# Patient Record
Sex: FEMALE | Race: WHITE | NOT HISPANIC OR LATINO | Employment: FULL TIME | URBAN - METROPOLITAN AREA
[De-identification: names, ages, dates, MRNs, and addresses within clinical notes are randomized per-mention and may not be internally consistent; named-entity substitution may affect disease eponyms.]

---

## 2019-03-29 ENCOUNTER — TELEPHONE (OUTPATIENT)
Dept: FAMILY MEDICINE CLINIC | Facility: CLINIC | Age: 29
End: 2019-03-29

## 2019-10-29 ENCOUNTER — TELEPHONE (OUTPATIENT)
Dept: OBGYN CLINIC | Facility: CLINIC | Age: 29
End: 2019-10-29

## 2019-10-29 NOTE — TELEPHONE ENCOUNTER
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

## 2019-11-22 ENCOUNTER — OFFICE VISIT (OUTPATIENT)
Dept: FAMILY MEDICINE CLINIC | Facility: CLINIC | Age: 29
End: 2019-11-22
Payer: COMMERCIAL

## 2019-11-22 ENCOUNTER — APPOINTMENT (OUTPATIENT)
Dept: RADIOLOGY | Facility: CLINIC | Age: 29
End: 2019-11-22
Payer: COMMERCIAL

## 2019-11-22 ENCOUNTER — OFFICE VISIT (OUTPATIENT)
Dept: OBGYN CLINIC | Facility: CLINIC | Age: 29
End: 2019-11-22
Payer: COMMERCIAL

## 2019-11-22 VITALS
HEIGHT: 63 IN | HEART RATE: 65 BPM | SYSTOLIC BLOOD PRESSURE: 110 MMHG | BODY MASS INDEX: 24.52 KG/M2 | DIASTOLIC BLOOD PRESSURE: 68 MMHG | WEIGHT: 138.4 LBS

## 2019-11-22 VITALS
RESPIRATION RATE: 12 BRPM | BODY MASS INDEX: 24.45 KG/M2 | OXYGEN SATURATION: 99 % | TEMPERATURE: 97.5 F | DIASTOLIC BLOOD PRESSURE: 70 MMHG | WEIGHT: 138 LBS | HEIGHT: 63 IN | HEART RATE: 100 BPM | SYSTOLIC BLOOD PRESSURE: 114 MMHG

## 2019-11-22 DIAGNOSIS — S62.635S: Primary | ICD-10-CM

## 2019-11-22 DIAGNOSIS — Z83.438 FAMILY HISTORY OF HYPERLIPIDEMIA: ICD-10-CM

## 2019-11-22 DIAGNOSIS — R53.83 FATIGUE, UNSPECIFIED TYPE: ICD-10-CM

## 2019-11-22 DIAGNOSIS — Z76.89 ENCOUNTER TO ESTABLISH CARE: ICD-10-CM

## 2019-11-22 DIAGNOSIS — S62.635B DISPLACED FRACTURE OF DISTAL PHALANX OF LEFT RING FINGER, INITIAL ENCOUNTER FOR OPEN FRACTURE: Primary | ICD-10-CM

## 2019-11-22 DIAGNOSIS — S62.635B DISPLACED FRACTURE OF DISTAL PHALANX OF LEFT RING FINGER, INITIAL ENCOUNTER FOR OPEN FRACTURE: ICD-10-CM

## 2019-11-22 PROCEDURE — 99243 OFF/OP CNSLTJ NEW/EST LOW 30: CPT | Performed by: ORTHOPAEDIC SURGERY

## 2019-11-22 PROCEDURE — 1036F TOBACCO NON-USER: CPT | Performed by: NURSE PRACTITIONER

## 2019-11-22 PROCEDURE — 36415 COLL VENOUS BLD VENIPUNCTURE: CPT | Performed by: NURSE PRACTITIONER

## 2019-11-22 PROCEDURE — 73130 X-RAY EXAM OF HAND: CPT

## 2019-11-22 PROCEDURE — 99204 OFFICE O/P NEW MOD 45 MIN: CPT | Performed by: NURSE PRACTITIONER

## 2019-11-22 RX ORDER — MULTIVITAMIN
1 TABLET ORAL DAILY
COMMUNITY

## 2019-11-22 NOTE — LETTER
November 25, 2019     Walter Frederick [address] [address]    Patient: Angelic Angeles   YOB: 1990   Date of Visit: 11/22/2019       Dear Dr Marino Cotto: Thank you for referring Meron Niño to me for evaluation  Below are my notes for this consultation  If you have questions, please do not hesitate to call me  I look forward to following your patient along with you  Sincerely,        Harinder Figueredo DO        CC: No Recipients  Harinder Figueredo DO  11/22/2019  5:58 PM  Signed  Assessment/Plan:  1  Displaced fracture of distal phalanx of left ring finger, initial encounter for open fracture  XR hand 3+ vw left    Ambulatory referral to Occupational Therapy     Patient is doing well  X-rays demonstrate anatomic alignment of the DIP  It was explained to her that she has a fracture of the base of P3 and significant tendon injury  She has had the pin in for 6 weeks  She will likely have some stiffness of this joint  We will start physical therapy this time  The pin was removed  She does have some granulation tissue and some wounds over the dorsal aspect of the index finger we will do Betadine soaks for this  She can also get wound care at therapy  I did receive records from the operating surgeon later surgery done in [place]  I reviewed them  Subjective: Follow up visit after LSF repair at OSH    Patient ID: Angelic Angeles is a 26 y o  female  HPI  Patient is a 26year old female who presents 6 weeks post op from DIP joint pitting and ext tendon repair zone 1 of the LSF for an open p3 fracture and ext injury from a fan blade done at an OSH  She lives in [place] and was in [place] when then injury occurred six weeks ago  She underwent uneventful surgery and presents today for follow up  She denies any fevers or chills  Old records were reviewed  Review of Systems   Constitutional: Positive for activity change   Negative for chills, fever and unexpected weight change  HENT: Negative for hearing loss, nosebleeds and sore throat  Eyes: Negative for pain, redness and visual disturbance  Respiratory: Negative for cough, shortness of breath and wheezing  Cardiovascular: Negative for chest pain, palpitations and leg swelling  Gastrointestinal: Negative for abdominal pain, nausea and vomiting  Endocrine: Negative for polydipsia and polyuria  Genitourinary: Negative for dysuria and hematuria  Musculoskeletal:        See HPI   Skin: Negative for rash and wound  Neurological: Negative for dizziness, numbness and headaches  Psychiatric/Behavioral: Negative for decreased concentration and suicidal ideas  The patient is not nervous/anxious            Past Medical History:   Diagnosis Date    Fractures     Status post tendon repair     tendon repair of left ring finger       Past Surgical History:   Procedure Laterality Date    HAND SURGERY      WISDOM TOOTH EXTRACTION         Family History   Problem Relation Age of Onset    No Known Problems Mother     No Known Problems Father     No Known Problems Sister     No Known Problems Brother     No Known Problems Maternal Aunt     No Known Problems Maternal Uncle     No Known Problems Paternal Aunt     No Known Problems Paternal Uncle     No Known Problems Maternal Grandmother     No Known Problems Maternal Grandfather     No Known Problems Paternal Grandmother     No Known Problems Paternal Grandfather     Substance Abuse Neg Hx     Mental illness Neg Hx        Social History     Occupational History    Not on file   Tobacco Use    Smoking status: Never Smoker    Smokeless tobacco: Never Used   Substance and Sexual Activity    Alcohol use: Never     Frequency: Never    Drug use: Never    Sexual activity: Yes     Partners: Male     Birth control/protection: Condom Male         Current Outpatient Medications:     Multiple Vitamin (MULTIVITAMIN) tablet, Take 1 tablet by mouth daily, Disp: , Rfl:     Probiotic Product (PROBIOTIC DAILY PO), Take by mouth, Disp: , Rfl:     No Known Allergies    Objective:  Vitals:    11/22/19 0914   BP: 110/68   Pulse: 65       Body mass index is 24 52 kg/m²  Ortho Exam     L SF  Granulation tissue present over the dorsal aspect of the DIP  No purulence  No redness  Pin rmoved without issue  Nail plate absent  Nail bed repaired and healing well  Limited motion as expected of DIP  No volar wounds    Physical Exam   Constitutional: She is oriented to person, place, and time  She appears well-developed and well-nourished  HENT:   Head: Normocephalic and atraumatic  Eyes: Pupils are equal, round, and reactive to light  Conjunctivae are normal    Neck: Normal range of motion  Neck supple  Cardiovascular: Normal rate and intact distal pulses  Pulmonary/Chest: Effort normal  No respiratory distress  Musculoskeletal:   As noted in HPI   Neurological: She is alert and oriented to person, place, and time  Skin: Skin is warm and dry  Psychiatric: She has a normal mood and affect  Her behavior is normal    Nursing note and vitals reviewed  I have personally reviewed pertinent films in PACS    Xr demonstrate healing fracture of the base of p3 with excellent alignment of the DIP

## 2019-11-22 NOTE — PROGRESS NOTES
Assessment/Plan:  1  Open displaced fracture of distal phalanx of left ring finger, sequela  Management per ortho  Physical therapy as ordered  - CBC and differential  - Vitamin D 25 hydroxy  - Comprehensive metabolic panel  2  Encounter to establish care  Health maintenance discussed  Diet, exercise, weight management, stress management, etc    All questions addressed, answered, and pt verbalized understanding  Anticipatory guidance  - CBC and differential  - Lipid panel  - TSH, 3rd generation  - Vitamin D 25 hydroxy  - Comprehensive metabolic panel  3  Fatigue, unspecified type  - CBC and differential  - TSH, 3rd generation  - Vitamin D 25 hydroxy  - Comprehensive metabolic panel  4  Family history of hyperlipidemia  - Lipid panel      BMI Counseling: Body mass index is 24 45 kg/m²  The BMI is wnl  Counseled on balanced diet and regular exercise  Subjective:      Patient ID: Mark Huang is a 26 y o  female who presents to establish care    Here to establish care  Had injury to left ring finger while on vacation in [place] requiring surgery  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 Follow up locally with Dr Eduard Rosales who saw her first time today  Injury occurred 10/3/19  Had pin placed  Removed today by Dr Eduard Rosales  Will be starting physical therapy  PMH: none  PSH: surgery to left ring finger 10/3/19 for fracture that involved tendons  Meds/allergies reviewed  FH: mother- htn, father- healthy, paternal grandmother- dm  SH: non smoker, no etoh  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[employer] ([employer])   Lives alone  No recreational drugs  Feels well  Occ fatigue  No recent illness           The following portions of the patient's history were reviewed and updated as appropriate: allergies, current medications, past family history, past medical history, past social history, past surgical history and problem list     Review of Systems   Constitutional: Negative for activity change, appetite change and unexpected weight change  HENT: Negative for congestion and sore throat  Eyes: Negative for visual disturbance  Respiratory: Negative for cough, chest tightness and shortness of breath  Cardiovascular: Negative for chest pain and leg swelling  Gastrointestinal: Negative for abdominal pain, diarrhea, nausea and vomiting  Endocrine: Negative for cold intolerance, heat intolerance, polydipsia, polyphagia and polyuria  Genitourinary: Negative for frequency and urgency  Musculoskeletal: Negative for back pain and neck pain  Left ring finger recent fracture  Pin removed  Will decreased motion but improving  Some discoloration  Healing well per ortho   Skin: Positive for wound (healing surgical wound left ring finger)  Allergic/Immunologic: Negative for immunocompromised state  Neurological: Negative for dizziness and headaches  Hematological: Negative for adenopathy  Psychiatric/Behavioral: Negative for self-injury and suicidal ideas  The patient is not nervous/anxious  All other systems reviewed and are negative  Objective:      /70 (BP Location: Right arm, Patient Position: Sitting, Cuff Size: Standard)   Pulse 100   Temp 97 5 °F (36 4 °C) (Temporal)   Resp 12   Ht 5' 3" (1 6 m)   Wt 62 6 kg (138 lb)   LMP 11/20/2019   SpO2 99%   BMI 24 45 kg/m²          Physical Exam   Constitutional: She is oriented to person, place, and time  She appears well-developed and well-nourished  No distress  Cardiovascular: Normal rate and regular rhythm  No JVD  No audible carotid bruit  No peripheral edema  Pulmonary/Chest: Effort normal and breath sounds normal  No respiratory distress  She has no wheezes  Abdominal: Soft  Bowel sounds are normal  She exhibits no distension  There is no tenderness  Musculoskeletal:   Decreased rom left ring finger  Mild edema left ring finger      Neurological: She is alert and oriented to person, place, and time  No cranial nerve deficit  Coordination normal    Skin: Skin is warm and dry  No rash noted  She is not diaphoretic  Mild ecchymosis left ring finger  Psychiatric: She has a normal mood and affect  Her behavior is normal  Judgment and thought content normal    Vitals reviewed

## 2019-11-22 NOTE — PROGRESS NOTES
Assessment/Plan:  1  Displaced fracture of distal phalanx of left ring finger, initial encounter for open fracture  XR hand 3+ vw left    Ambulatory referral to Occupational Therapy     Patient is doing well  X-rays demonstrate anatomic alignment of the DIP  It was explained to her that she has a fracture of the base of P3 and significant tendon injury  She has had the pin in for 6 weeks  She will likely have some stiffness of this joint  We will start physical therapy this time  The pin was removed  She does have some granulation tissue and some wounds over the dorsal aspect of the index finger we will do Betadine soaks for this  She can also get wound care at therapy  I did receive records from the operating surgeon later surgery done in [place]  I reviewed them  Subjective: Follow up visit after LSF repair at OSH    Patient ID: Juana Arboleda is a 26 y o  female  HPI  Patient is a 26year old female who presents 6 weeks post op from DIP joint pitting and ext tendon repair zone 1 of the LSF for an open p3 fracture and ext injury from a fan blade done at an OSH  She lives in [place] and was in [place] when then injury occurred six weeks ago  She underwent uneventful surgery and presents today for follow up  She denies any fevers or chills  Old records were reviewed  Review of Systems   Constitutional: Positive for activity change  Negative for chills, fever and unexpected weight change  HENT: Negative for hearing loss, nosebleeds and sore throat  Eyes: Negative for pain, redness and visual disturbance  Respiratory: Negative for cough, shortness of breath and wheezing  Cardiovascular: Negative for chest pain, palpitations and leg swelling  Gastrointestinal: Negative for abdominal pain, nausea and vomiting  Endocrine: Negative for polydipsia and polyuria  Genitourinary: Negative for dysuria and hematuria  Musculoskeletal:        See HPI   Skin: Negative for rash and wound  Neurological: Negative for dizziness, numbness and headaches  Psychiatric/Behavioral: Negative for decreased concentration and suicidal ideas  The patient is not nervous/anxious  Past Medical History:   Diagnosis Date    Fractures     Status post tendon repair     tendon repair of left ring finger       Past Surgical History:   Procedure Laterality Date    HAND SURGERY      WISDOM TOOTH EXTRACTION         Family History   Problem Relation Age of Onset    No Known Problems Mother     No Known Problems Father     No Known Problems Sister     No Known Problems Brother     No Known Problems Maternal Aunt     No Known Problems Maternal Uncle     No Known Problems Paternal Aunt     No Known Problems Paternal Uncle     No Known Problems Maternal Grandmother     No Known Problems Maternal Grandfather     No Known Problems Paternal Grandmother     No Known Problems Paternal Grandfather     Substance Abuse Neg Hx     Mental illness Neg Hx        Social History     Occupational History    Not on file   Tobacco Use    Smoking status: Never Smoker    Smokeless tobacco: Never Used   Substance and Sexual Activity    Alcohol use: Never     Frequency: Never    Drug use: Never    Sexual activity: Yes     Partners: Male     Birth control/protection: Condom Male         Current Outpatient Medications:     Multiple Vitamin (MULTIVITAMIN) tablet, Take 1 tablet by mouth daily, Disp: , Rfl:     Probiotic Product (PROBIOTIC DAILY PO), Take by mouth, Disp: , Rfl:     No Known Allergies    Objective:  Vitals:    11/22/19 0914   BP: 110/68   Pulse: 65       Body mass index is 24 52 kg/m²  Ortho Exam     L SF  Granulation tissue present over the dorsal aspect of the DIP  No purulence  No redness  Pin rmoved without issue  Nail plate absent  Nail bed repaired and healing well  Limited motion as expected of DIP  No volar wounds    Physical Exam   Constitutional: She is oriented to person, place, and time  She appears well-developed and well-nourished  HENT:   Head: Normocephalic and atraumatic  Eyes: Pupils are equal, round, and reactive to light  Conjunctivae are normal    Neck: Normal range of motion  Neck supple  Cardiovascular: Normal rate and intact distal pulses  Pulmonary/Chest: Effort normal  No respiratory distress  Musculoskeletal:   As noted in HPI   Neurological: She is alert and oriented to person, place, and time  Skin: Skin is warm and dry  Psychiatric: She has a normal mood and affect  Her behavior is normal    Nursing note and vitals reviewed  I have personally reviewed pertinent films in PACS    Xr demonstrate healing fracture of the base of p3 with excellent alignment of the DIP

## 2019-11-23 LAB
25(OH)D3+25(OH)D2 SERPL-MCNC: 17 NG/ML (ref 30–100)
ALBUMIN SERPL-MCNC: 4.8 G/DL (ref 3.5–5.5)
ALBUMIN/GLOB SERPL: 2.1 {RATIO} (ref 1.2–2.2)
ALP SERPL-CCNC: 65 IU/L (ref 39–117)
ALT SERPL-CCNC: 9 IU/L (ref 0–32)
AST SERPL-CCNC: 15 IU/L (ref 0–40)
BASOPHILS # BLD AUTO: 0 X10E3/UL (ref 0–0.2)
BASOPHILS NFR BLD AUTO: 0 %
BILIRUB SERPL-MCNC: 0.5 MG/DL (ref 0–1.2)
BUN SERPL-MCNC: 6 MG/DL (ref 6–20)
BUN/CREAT SERPL: 10 (ref 9–23)
CALCIUM SERPL-MCNC: 9.2 MG/DL (ref 8.7–10.2)
CHLORIDE SERPL-SCNC: 103 MMOL/L (ref 96–106)
CHOLEST SERPL-MCNC: 155 MG/DL (ref 100–199)
CHOLEST/HDLC SERPL: 2.9 RATIO (ref 0–4.4)
CO2 SERPL-SCNC: 21 MMOL/L (ref 20–29)
CREAT SERPL-MCNC: 0.63 MG/DL (ref 0.57–1)
EOSINOPHIL # BLD AUTO: 0 X10E3/UL (ref 0–0.4)
EOSINOPHIL NFR BLD AUTO: 1 %
ERYTHROCYTE [DISTWIDTH] IN BLOOD BY AUTOMATED COUNT: 13.6 % (ref 12.3–15.4)
GLOBULIN SER-MCNC: 2.3 G/DL (ref 1.5–4.5)
GLUCOSE SERPL-MCNC: 86 MG/DL (ref 65–99)
HCT VFR BLD AUTO: 41.3 % (ref 34–46.6)
HDLC SERPL-MCNC: 54 MG/DL
HGB BLD-MCNC: 13.9 G/DL (ref 11.1–15.9)
IMM GRANULOCYTES # BLD: 0 X10E3/UL (ref 0–0.1)
IMM GRANULOCYTES NFR BLD: 0 %
LDLC SERPL CALC-MCNC: 90 MG/DL (ref 0–99)
LYMPHOCYTES # BLD AUTO: 1 X10E3/UL (ref 0.7–3.1)
LYMPHOCYTES NFR BLD AUTO: 22 %
MCH RBC QN AUTO: 28.5 PG (ref 26.6–33)
MCHC RBC AUTO-ENTMCNC: 33.7 G/DL (ref 31.5–35.7)
MCV RBC AUTO: 85 FL (ref 79–97)
MONOCYTES # BLD AUTO: 0.4 X10E3/UL (ref 0.1–0.9)
MONOCYTES NFR BLD AUTO: 8 %
NEUTROPHILS # BLD AUTO: 3.3 X10E3/UL (ref 1.4–7)
NEUTROPHILS NFR BLD AUTO: 69 %
PLATELET # BLD AUTO: 248 X10E3/UL (ref 150–450)
POTASSIUM SERPL-SCNC: 3.9 MMOL/L (ref 3.5–5.2)
PROT SERPL-MCNC: 7.1 G/DL (ref 6–8.5)
RBC # BLD AUTO: 4.87 X10E6/UL (ref 3.77–5.28)
SL AMB EGFR AFRICAN AMERICAN: 139 ML/MIN/1.73
SL AMB EGFR NON AFRICAN AMERICAN: 121 ML/MIN/1.73
SL AMB VLDL CHOLESTEROL CALC: 11 MG/DL (ref 5–40)
SODIUM SERPL-SCNC: 140 MMOL/L (ref 134–144)
TRIGL SERPL-MCNC: 53 MG/DL (ref 0–149)
TSH SERPL DL<=0.005 MIU/L-ACNC: 2.07 UIU/ML (ref 0.45–4.5)
WBC # BLD AUTO: 4.8 X10E3/UL (ref 3.4–10.8)

## 2019-12-06 ENCOUNTER — OFFICE VISIT (OUTPATIENT)
Dept: OBGYN CLINIC | Facility: CLINIC | Age: 29
End: 2019-12-06
Payer: COMMERCIAL

## 2019-12-06 VITALS
SYSTOLIC BLOOD PRESSURE: 103 MMHG | DIASTOLIC BLOOD PRESSURE: 69 MMHG | BODY MASS INDEX: 24.8 KG/M2 | HEART RATE: 69 BPM | WEIGHT: 140 LBS | HEIGHT: 63 IN

## 2019-12-06 DIAGNOSIS — S62.635D DISPLACED FRACTURE OF DISTAL PHALANX OF LEFT RING FINGER, SUBSEQUENT ENCOUNTER FOR FRACTURE WITH ROUTINE HEALING: Primary | ICD-10-CM

## 2019-12-06 PROCEDURE — 99213 OFFICE O/P EST LOW 20 MIN: CPT | Performed by: ORTHOPAEDIC SURGERY

## 2019-12-13 ENCOUNTER — EVALUATION (OUTPATIENT)
Dept: PHYSICAL THERAPY | Facility: CLINIC | Age: 29
End: 2019-12-13
Payer: COMMERCIAL

## 2019-12-13 DIAGNOSIS — S62.639A: Primary | ICD-10-CM

## 2019-12-13 DIAGNOSIS — M20.012 MALLET FINGER OF LEFT FINGER(S): ICD-10-CM

## 2019-12-13 PROCEDURE — 97162 PT EVAL MOD COMPLEX 30 MIN: CPT

## 2019-12-13 NOTE — PROGRESS NOTES
PT Evaluation     Today's date: 2019  Patient name: Monika Vital  : 1990  MRN: [id number]  Referring provider: Shruthi Ross DO  Dx:   Encounter Diagnosis     ICD-10-CM    1  Displaced fracture of distal phalanx of finger of left hand S62 639A    2  Mallet finger of left finger(s) M20 012 Ambulatory referral to Occupational Therapy       Start Time: 805  Stop Time: 198  Total time in clinic (min): 65 minutes    Assessment  Assessment details: Patient presents today after extensor tendon repair on 10/9/19 over DIP with P3 fracture  Pain is intermittent reaching 5/10 at worst over healing nail bed  Wound and pin site is clean and healing well with no open wounds present  AROM limitations in extension of DIP of L ring finger indicated by ROM measurements  Patient was provided with custom extension splint for DIP of L ring finger which she was thoroughly educated to wear full time as per MD and demonstrated how to don/doff  Custom splint was inspected by MD prior to patient leaving, and MD stated that splint was good  Due to patient non-compliance with wearing splint as she arrived today without wearing splint originally provided by MD she was thoroughly educated on importance of being compliant with wear time  She was provided with comprehensive HEP to complete while wearing splint; patient verbalized understanding  Patient would benefit from skilled PT in order to reduce pain, increase ROM, and improve strength of L UE so 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, return to [employer],  complete ADLs and household chores, complete yard work, and sleep symptom free  Impairments: abnormal muscle tone, abnormal or restricted ROM, lacks appropriate home exercise program and pain with function  Understanding of Dx/Px/POC: good   Prognosis: good    Goals  STG's (4-6 weeks)  1  Reduce pain in L ring finger to 0/10 80% of the time so she can complete household chores where she lives alone     2  Improve pain free AROM/PROM of L ring finger so she can return to work as  and make pottery  3  Improve  strength by 10 lbs to improve her ability to open jars  4  Demonstrate how to properly don/doff splint     LTGs (6-8 weeks )  1  Patient will improve  strength to WNL for woman in her age group  to safely ensure she can carry and  heavy objects without risk of re injury where she lives alone  2  Patient will be able to extend DIP of L ring finger to 0 degrees after wearing splint full time  3  Patient will be able to weight bear through L UE pain free such as while cleaning or pushing somehting heavy  Plan  Patient would benefit from: PT eval and skilled physical therapy  Planned modality interventions: TENS, thermotherapy: hydrocollator packs and cryotherapy  Planned therapy interventions: joint mobilization, manual therapy, patient education, neuromuscular re-education, abdominal trunk stabilization, orthotic management and training, orthotic fitting/training, strengthening, stretching, therapeutic activities, therapeutic exercise, home exercise program, graded motor, graded exercise, graded activity, functional ROM exercises and flexibility  Frequency: 2x week  Duration in weeks: 8  Plan of Care beginning date: 12/13/2019  Plan of Care expiration date: 2/7/2020  Treatment plan discussed with: patient        Subjective Evaluation    History of Present Illness  Mechanism of injury: Patient reports that on 10/3/19 she was on vacation in [place]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 in [address]  Had extensor tendon repair on 10/9/19  Arrived today without splint provided by MD  Pain is at worst 5/10 when hit nail bed, also has pain from pinky that goes up to forearm  NT present on pinky  Patient asked if she can continue with making pottery and yoga , but she was thoroughly educated to hold from these activities still as she is still in splint full time  Pain  Current pain ratin  At best pain ratin  At worst pain ratin  Location: DIP joint of R ring finger   Quality: dull ache  Relieving factors: relaxation and rest    Social Support  Steps to enter house: yes  Stairs in house: yes   Lives in: multiple-level home  Lives with: alone    Employment status: working (retail )  Hand dominance: left    Treatments  Previous treatment: physical therapy  Current treatment: physical therapy  Patient Goals  Patient goals for therapy: increased strength, independence with ADLs/IADLs, increased motion, decreased pain and decreased edema          Objective     Active Range of Motion     Left Thumb   Extension     DIP: -15 degrees  Opposition: Table top extension: -(10)         Swelling     Right Wrist/Hand   Ring     Middle: 4 7 cm    Distal: 0 5 cm             Precautions:  Extensor tendon repair of DIP of Right finger- full time splint for 4 weeks  DOS=10/9/19    TE's:  Finger Blocking with splint on: 1 x 10 each joint (not DIP of L ring finger)  Table top Tendon Glides with splint on: 10 x   Straight Fist Tendon Glide with splint on: 10 x   Wrist AROM: all planes  Objective Measurements: 15'     Manual:  Wound Care: Bacatracin applied to nail bed with sterile applicator and sterile gloves worn by PT  Sterile bandages applied to wound site while PT wearing sterile gloves  Orthotic Fit & train:  Volar mallet finger splint-  For ring finger DIP   Splint pan maintained at 160 deg  Excess water was dried off prior to application onto patient  Barrier used between skin and splinting material  Patient thoroughly educated on use of splint full time and contact treating PT if notices any pressure points, sharp points, rubbing, bruising, etc  She verbalized understanding         Updated HEP: 19- table top and straight fist tendon glides, wrist AROM, finger blocking all joints except ring finger DIP- all completed while wearing custom splint provided by treating PT

## 2019-12-17 ENCOUNTER — OFFICE VISIT (OUTPATIENT)
Dept: PHYSICAL THERAPY | Facility: CLINIC | Age: 29
End: 2019-12-17
Payer: COMMERCIAL

## 2019-12-17 DIAGNOSIS — M20.012 MALLET FINGER OF LEFT FINGER(S): ICD-10-CM

## 2019-12-17 DIAGNOSIS — S62.639A: Primary | ICD-10-CM

## 2019-12-17 PROCEDURE — 97763 ORTHC/PROSTC MGMT SBSQ ENC: CPT

## 2019-12-17 PROCEDURE — 97110 THERAPEUTIC EXERCISES: CPT

## 2019-12-17 NOTE — PROGRESS NOTES
Daily Note     Today's date: 2019  Patient name: Bonilla Ren  : 1990  MRN: [id number]  Referring provider: Rita Almendarez DO  Dx:   Encounter Diagnosis     ICD-10-CM    1  Displaced fracture of distal phalanx of finger of left hand S62 639A    2  Mallet finger of left finger(s) M20 012        Start Time: 0755  Stop Time: 0845  Total time in clinic (min): 50 minutes    Subjective: patient reports soreness at home when completing HEP  Continues to wear splint full time  Objective: See treatment diary below      Assessment: Tolerated treatment well  Patient would benefit from continued PT      Plan: Continue per plan of care  Precautions:  Extensor tendon repair of DIP of Right finger- full time splint for 4 weeks  DOS=10/9/19    TE's: all completed with DIP splint on to keep DIP in extension as per MD  Finger Blocking with splint on: 1 x 10 each joint (not DIP of L ring finger)  Table top Tendon Glides with splint on: 10 x   Straight Fist Tendon Glide with splint on: 10 x   Peg board: each digit to thumb in and out  [employer] Board: 1 board in and out  [facility]  supination/pronation: 30x    Manual:  Wound Care: Bacatracin applied to nail bed with sterile applicator and sterile gloves worn by PT  Sterile bandages applied to wound site while PT wearing sterile gloves  Custom Splint Adjustment: For ring finger DIP   Splint pan maintained at 160 deg  Excess water was dried off prior to application onto patient  Barrier used between skin and splinting material  Adjusted splint to increase DIP extension and added foam for increase DIP extension  Patient thoroughly educated on use of splint full time and contact treating PT if notices any pressure points, sharp points, rubbing, bruising, etc  She verbalized understanding

## 2019-12-19 ENCOUNTER — APPOINTMENT (OUTPATIENT)
Dept: PHYSICAL THERAPY | Facility: CLINIC | Age: 29
End: 2019-12-19
Payer: COMMERCIAL

## 2019-12-20 ENCOUNTER — OFFICE VISIT (OUTPATIENT)
Dept: PHYSICAL THERAPY | Facility: CLINIC | Age: 29
End: 2019-12-20
Payer: COMMERCIAL

## 2019-12-20 ENCOUNTER — OFFICE VISIT (OUTPATIENT)
Dept: OBGYN CLINIC | Facility: CLINIC | Age: 29
End: 2019-12-20
Payer: COMMERCIAL

## 2019-12-20 ENCOUNTER — APPOINTMENT (OUTPATIENT)
Dept: RADIOLOGY | Facility: CLINIC | Age: 29
End: 2019-12-20
Payer: COMMERCIAL

## 2019-12-20 VITALS — BODY MASS INDEX: 24.8 KG/M2 | WEIGHT: 140 LBS | HEIGHT: 63 IN

## 2019-12-20 DIAGNOSIS — S62.635D DISPLACED FRACTURE OF DISTAL PHALANX OF LEFT RING FINGER, SUBSEQUENT ENCOUNTER FOR FRACTURE WITH ROUTINE HEALING: Primary | ICD-10-CM

## 2019-12-20 DIAGNOSIS — S62.639A: Primary | ICD-10-CM

## 2019-12-20 DIAGNOSIS — M20.012 MALLET FINGER OF LEFT FINGER(S): ICD-10-CM

## 2019-12-20 DIAGNOSIS — S62.635D DISPLACED FRACTURE OF DISTAL PHALANX OF LEFT RING FINGER, SUBSEQUENT ENCOUNTER FOR FRACTURE WITH ROUTINE HEALING: ICD-10-CM

## 2019-12-20 PROCEDURE — 97140 MANUAL THERAPY 1/> REGIONS: CPT

## 2019-12-20 PROCEDURE — 97110 THERAPEUTIC EXERCISES: CPT

## 2019-12-20 PROCEDURE — 73140 X-RAY EXAM OF FINGER(S): CPT

## 2019-12-20 PROCEDURE — 99213 OFFICE O/P EST LOW 20 MIN: CPT | Performed by: ORTHOPAEDIC SURGERY

## 2019-12-20 RX ORDER — CEPHALEXIN 500 MG/1
500 CAPSULE ORAL EVERY 8 HOURS SCHEDULED
Qty: 21 CAPSULE | Refills: 0 | Status: SHIPPED | OUTPATIENT
Start: 2019-12-20 | End: 2019-12-27

## 2019-12-20 NOTE — PROGRESS NOTES
Daily Note     Today's date: 2019  Patient name: Juany Mann  : 1990  MRN: [id number]  Referring provider: Ursula Proctor DO  Dx:   Encounter Diagnosis     ICD-10-CM    1  Displaced fracture of distal phalanx of finger of left hand S62 639A    2  Mallet finger of left finger(s) M20 012        Start Time: 0850  Stop Time: 1000  Total time in clinic (min): 70 minutes    Subjective: patient reports that she has been non compliant with wearing splint  She took it off last night because she got it wet and never put it back on  Slept without wearing splint all night  Reports that she no longer has shooting pain down pinky side and notices her fingers are more flexible  Pain is at worst 3-4/10 located in wrist and L ring finger  Seeing MD today  Objective: See treatment diary below    Composite Flexion:  LF MP: 95 deg  LF: PIP: 105 deg  LF: DIP: 55 deg  RF MPL 95 deg  RF PIP: unable due to splint  RF  DIP: unable due to splint  MF MP: 95 deg  MF PIP 100deg  MF DIP: 40 deg  IF MP: 80 deg  IF PIP: 100 deg  IF DIP : 40 deg    Digit Extension  LF MP: 0 deg  LF: PIP: -5 deg  LF: DIP: -10 deg  RF MP: 0 deg  RF PIP: 0 deg  RF  DIP: unable due to splint  MF MP: 0 deg  MF PIP 0deg  MF DIP: 0 deg  IF MP:  0 deg  IF PIP: -5- deg  IF DIP : -5 deg    Assessment: Tolerated treatment well  Patient was thoroughly educated in wear time of splint again after reporting she has been non compliant with wearing full time  Educated on importance of wearing splint full time to allow structures to fully heal  Continues to have extension lag present on DIP of L ring finger  Plan: Continue per plan of care        Precautions:  Extensor tendon repair of DIP of Right finger- full time splint for 4 weeks  DOS=10/9/19    TE's: all completed with DIP splint on to keep DIP in extension as per MD  Table top Tendon Glides with splint on: 20 x  Hook Fist Tendon Glides with splint on: 20 x   Full Fist Tendon Glides with splint on: 20 x Straight Fist Tendon Glide with splint on: 20 x   Peg board: each digit to thumb in and out  [employer] Board: 1 board in and out  [employer]  supination/pronation: 30x  Towel Scrunches: 2'     Manual:  Finger Blocking with splint on: 1 x 10 each joint (not DIP of L ring finger)  IASTM: hand intrinsics-with splint on avoiding DIP  PIP and MP PROM of L Ring Finger: pain free 2 x 10 each   Wound Care: Bacatracin applied to nail bed with sterile applicator and sterile gloves worn by PT  Sterile bandages applied to wound site while PT wearing sterile gloves  Custom Splint Adjustment: For ring finger DIP   Splint pan maintained at 160 deg  Excess water was dried off prior to application onto patient  Barrier used between skin and splinting material  Adjusted splint to increase DIP extension  Patient thoroughly educated on use of splint full time and contact treating PT if notices any pressure points, sharp points, rubbing, bruising, etc  She verbalized understanding

## 2019-12-24 ENCOUNTER — APPOINTMENT (OUTPATIENT)
Dept: PHYSICAL THERAPY | Facility: CLINIC | Age: 29
End: 2019-12-24
Payer: COMMERCIAL

## 2019-12-27 ENCOUNTER — APPOINTMENT (OUTPATIENT)
Dept: PHYSICAL THERAPY | Facility: CLINIC | Age: 29
End: 2019-12-27
Payer: COMMERCIAL

## 2019-12-27 ENCOUNTER — OFFICE VISIT (OUTPATIENT)
Dept: OBGYN CLINIC | Facility: CLINIC | Age: 29
End: 2019-12-27
Payer: COMMERCIAL

## 2019-12-27 VITALS — BODY MASS INDEX: 24.8 KG/M2 | WEIGHT: 140 LBS | HEIGHT: 63 IN

## 2019-12-27 DIAGNOSIS — S62.635D DISPLACED FRACTURE OF DISTAL PHALANX OF LEFT RING FINGER, SUBSEQUENT ENCOUNTER FOR FRACTURE WITH ROUTINE HEALING: Primary | ICD-10-CM

## 2019-12-27 PROCEDURE — 99213 OFFICE O/P EST LOW 20 MIN: CPT | Performed by: ORTHOPAEDIC SURGERY

## 2019-12-27 NOTE — PROGRESS NOTES
Assessment/Plan:  1  Displaced fracture of distal phalanx of left ring finger, subsequent encounter for fracture with routine healing  Ambulatory referral to PT/OT hand therapy       Scribe Attestation    I,:   Edwina Biswas MA am acting as a scribe while in the presence of the attending physician :        I,:   Breonna Means DO personally performed the services described in this documentation    as scribed in my presence :              Jacklyn's doing well  Her wound is well healed  She was instructed to discontinue the use of the finger splint  She will continue with formal physical therapy  I explained to her I would like her to go to a certified hand therapist for this  Patient is aware she may require additional surgery but would like to continue conservative treatment options at this time  She will follow up in 6 weeks for repeat evaluation and repeat x-ray  Subjective:   Madison Perez is a 26 y o  female who presents to the office today for follow up evaluation 11 weeks s/p left ring finger DIP joint pinning and extensor tendon repair performed at [facility]  Patient states she has been compliant with splint use  She states the wounds have healed well  Patient states she has been complaint with anti-biotics and wound care  She states her pain has improved since the wounds have healed  She states she has not attended therapy this past week due to the wound  Review of Systems   Constitutional: Negative for chills and fever  HENT: Negative for drooling and sneezing  Eyes: Negative for redness  Respiratory: Negative for cough and wheezing  Gastrointestinal: Negative for nausea and vomiting  Musculoskeletal: Negative for arthralgias, joint swelling and myalgias  Neurological: Negative for weakness and numbness  Psychiatric/Behavioral: Negative for behavioral problems  The patient is not nervous/anxious            Past Medical History:   Diagnosis Date    Anxiety     Fractures     Status post tendon repair     tendon repair of left ring finger       Past Surgical History:   Procedure Laterality Date    HAND SURGERY      WISDOM TOOTH EXTRACTION         Family History   Problem Relation Age of Onset    No Known Problems Mother     No Known Problems Father     No Known Problems Sister     No Known Problems Brother     No Known Problems Maternal Aunt     No Known Problems Maternal Uncle     No Known Problems Paternal Aunt     No Known Problems Paternal Uncle     No Known Problems Maternal Grandmother     No Known Problems Maternal Grandfather     No Known Problems Paternal Grandmother     No Known Problems Paternal Grandfather     Substance Abuse Neg Hx     Mental illness Neg Hx        Social History     Occupational History    Not on file   Tobacco Use    Smoking status: Never Smoker    Smokeless tobacco: Never Used   Substance and Sexual Activity    Alcohol use: Never     Frequency: Never    Drug use: Never    Sexual activity: Yes     Partners: Male     Birth control/protection: Condom Male         Current Outpatient Medications:     cephalexin (KEFLEX) 500 mg capsule, Take 1 capsule (500 mg total) by mouth every 8 (eight) hours for 7 days, Disp: 21 capsule, Rfl: 0    Multiple Vitamin (MULTIVITAMIN) tablet, Take 1 tablet by mouth daily, Disp: , Rfl:     Probiotic Product (PROBIOTIC DAILY PO), Take by mouth, Disp: , Rfl:     No Known Allergies    Objective: There were no vitals filed for this visit  Ortho Exam     Left ring finger    Wounds well healed   Compartments soft  Brisk capillary refill  S/m intact median, radial, and ulnar nerve   Limited motion of DIP    Physical Exam   Constitutional: She is oriented to person, place, and time  She appears well-developed and well-nourished  HENT:   Head: Normocephalic and atraumatic  Eyes: Conjunctivae are normal  Right eye exhibits no discharge  Left eye exhibits no discharge  Neck: Normal range of motion  Neck supple  Cardiovascular: Normal rate and intact distal pulses  Pulmonary/Chest: Effort normal  No respiratory distress  Musculoskeletal:   As noted in HPI   Neurological: She is alert and oriented to person, place, and time  Skin: Skin is warm and dry  Psychiatric: She has a normal mood and affect   Her behavior is normal  Judgment and thought content normal

## 2019-12-31 ENCOUNTER — APPOINTMENT (OUTPATIENT)
Dept: PHYSICAL THERAPY | Facility: CLINIC | Age: 29
End: 2019-12-31
Payer: COMMERCIAL

## 2020-01-06 ENCOUNTER — TELEPHONE (OUTPATIENT)
Dept: PHYSICAL THERAPY | Facility: CLINIC | Age: 30
End: 2020-01-06

## 2020-02-07 ENCOUNTER — APPOINTMENT (OUTPATIENT)
Dept: RADIOLOGY | Facility: CLINIC | Age: 30
End: 2020-02-07
Payer: COMMERCIAL

## 2020-02-07 ENCOUNTER — OFFICE VISIT (OUTPATIENT)
Dept: OBGYN CLINIC | Facility: CLINIC | Age: 30
End: 2020-02-07
Payer: COMMERCIAL

## 2020-02-07 VITALS
DIASTOLIC BLOOD PRESSURE: 74 MMHG | WEIGHT: 146 LBS | HEIGHT: 63 IN | SYSTOLIC BLOOD PRESSURE: 114 MMHG | BODY MASS INDEX: 25.87 KG/M2 | HEART RATE: 94 BPM

## 2020-02-07 DIAGNOSIS — M79.642 LEFT HAND PAIN: ICD-10-CM

## 2020-02-07 DIAGNOSIS — S62.635D DISPLACED FRACTURE OF DISTAL PHALANX OF LEFT RING FINGER, SUBSEQUENT ENCOUNTER FOR FRACTURE WITH ROUTINE HEALING: Primary | ICD-10-CM

## 2020-02-07 PROCEDURE — 73140 X-RAY EXAM OF FINGER(S): CPT

## 2020-02-07 PROCEDURE — 3008F BODY MASS INDEX DOCD: CPT | Performed by: ORTHOPAEDIC SURGERY

## 2020-02-07 PROCEDURE — 99213 OFFICE O/P EST LOW 20 MIN: CPT | Performed by: ORTHOPAEDIC SURGERY

## 2020-02-07 PROCEDURE — 1036F TOBACCO NON-USER: CPT | Performed by: ORTHOPAEDIC SURGERY

## 2020-02-07 NOTE — PROGRESS NOTES
Assessment/Plan:  1  Displaced fracture of distal phalanx of left ring finger, subsequent encounter for fracture with routine healing  Ambulatory referral to PT/OT hand therapy   2  Left hand pain  XR finger left fourth digit-ring       Scribe Attestation    I,:   Edwina Biswas MA am acting as a scribe while in the presence of the attending physician :        I,:   Bobby Figueredo, DO personally performed the services described in this documentation    as scribed in my presence :              Jacklyn is doing well  She has no pain  Patient is able to perform ADLs without any issues  A new referral was provided to physical therapy for range of motion and strengthening  Patient did discontinue therapy due to her insurance  She has no restrictions at this time  She is aware that she may have issues in the future however, we will continue to hold off on surgical intervention at this time  X-rays are relatively unchanged  Patient is aware that the DIP joint may auto fuse  She may follow up with me as needed  Subjective:   Sage Diaz is a 26 y o  female who presents to the office today for follow up evaluation 17 weeks s/p left ing finger DIP joint pinning and extensor tendon repair performed at [facility]  Patient states she is doing well  She denies any pain  She notes improvement in her range of motion  She notes increased sensation about the finger  Patient states her nail is growing  She states she is able to perform her ADLs without any issues  She does use CBD cream as needed for pain  Review of Systems   Constitutional: Negative for chills and fever  HENT: Negative for drooling and sneezing  Eyes: Negative for redness  Respiratory: Negative for cough and wheezing  Gastrointestinal: Negative for nausea and vomiting  Musculoskeletal: Negative for arthralgias, joint swelling and myalgias  Neurological: Negative for weakness and numbness  Psychiatric/Behavioral: Negative for behavioral problems   The patient is not nervous/anxious  Past Medical History:   Diagnosis Date    Anxiety     Fractures     Status post tendon repair     tendon repair of left ring finger       Past Surgical History:   Procedure Laterality Date    HAND SURGERY      WISDOM TOOTH EXTRACTION         Family History   Problem Relation Age of Onset    No Known Problems Mother     No Known Problems Father     No Known Problems Sister     No Known Problems Brother     No Known Problems Maternal Aunt     No Known Problems Maternal Uncle     No Known Problems Paternal Aunt     No Known Problems Paternal Uncle     No Known Problems Maternal Grandmother     No Known Problems Maternal Grandfather     No Known Problems Paternal Grandmother     No Known Problems Paternal Grandfather     Substance Abuse Neg Hx     Mental illness Neg Hx        Social History     Occupational History    Not on file   Tobacco Use    Smoking status: Never Smoker    Smokeless tobacco: Never Used   Substance and Sexual Activity    Alcohol use: Never     Frequency: Never    Drug use: Never    Sexual activity: Yes     Partners: Male     Birth control/protection: Condom Male         Current Outpatient Medications:     Multiple Vitamin (MULTIVITAMIN) tablet, Take 1 tablet by mouth daily, Disp: , Rfl:     Probiotic Product (PROBIOTIC DAILY PO), Take by mouth, Disp: , Rfl:     No Known Allergies    Objective:  Vitals:    02/07/20 0914   BP: 114/74   Pulse: 94       Ortho Exam     Left ring finger    Compartments soft  Brisk capillary refill  S/m intact median, radial, and ulnar nerve   Limited motion of the DIP  Mildly tender over the dorsal aspect  Nail growing    Physical Exam   Constitutional: She is oriented to person, place, and time  She appears well-developed and well-nourished  HENT:   Head: Normocephalic and atraumatic  Eyes: Conjunctivae are normal  Right eye exhibits no discharge  Left eye exhibits no discharge     Neck: Normal range of motion  Neck supple  Cardiovascular: Normal rate and intact distal pulses  Pulmonary/Chest: Effort normal  No respiratory distress  Musculoskeletal:   As noted in HPI   Neurological: She is alert and oriented to person, place, and time  Skin: Skin is warm and dry  Psychiatric: She has a normal mood and affect  Her behavior is normal  Judgment and thought content normal        I have personally reviewed pertinent films in PACS and my interpretation is as follows:  Xr LRF demonstrates subluxed DIP similar to prior film   No signs of osteo    Orion in place similar to last film, no loosening

## 2020-07-17 ENCOUNTER — TELEPHONE (OUTPATIENT)
Dept: FAMILY MEDICINE CLINIC | Facility: CLINIC | Age: 30
End: 2020-07-17

## 2021-01-22 ENCOUNTER — TELEPHONE (OUTPATIENT)
Dept: FAMILY MEDICINE CLINIC | Facility: CLINIC | Age: 31
End: 2021-01-22

## 2024-11-27 ENCOUNTER — VBI (OUTPATIENT)
Dept: ADMINISTRATIVE | Facility: OTHER | Age: 34
End: 2024-11-27

## 2024-11-27 NOTE — TELEPHONE ENCOUNTER
11/27/24 10:46 AM     Chart reviewed for Pap Smear (HPV) aka Cervical Cancer Screening ; nothing is submitted to the patient's insurance at this time.     Juan José Arriaga MA   [facility]